# Patient Record
Sex: MALE | Race: WHITE
[De-identification: names, ages, dates, MRNs, and addresses within clinical notes are randomized per-mention and may not be internally consistent; named-entity substitution may affect disease eponyms.]

---

## 2018-04-07 ENCOUNTER — HOSPITAL ENCOUNTER (EMERGENCY)
Age: 58
LOS: 1 days | Discharge: HOME | End: 2018-04-08

## 2018-04-07 ENCOUNTER — HOSPITAL ENCOUNTER (EMERGENCY)
Dept: HOSPITAL 91 - E/R | Age: 58
LOS: 1 days | Discharge: HOME | End: 2018-04-08
Payer: COMMERCIAL

## 2018-04-07 DIAGNOSIS — R41.82: Primary | ICD-10-CM

## 2018-04-07 DIAGNOSIS — F12.129: ICD-10-CM

## 2018-04-07 PROCEDURE — 81001 URINALYSIS AUTO W/SCOPE: CPT

## 2018-04-07 PROCEDURE — 80053 COMPREHEN METABOLIC PANEL: CPT

## 2018-04-07 PROCEDURE — 99284 EMERGENCY DEPT VISIT MOD MDM: CPT

## 2018-04-07 PROCEDURE — 85025 COMPLETE CBC W/AUTO DIFF WBC: CPT

## 2018-04-07 PROCEDURE — 80306 DRUG TEST PRSMV INSTRMNT: CPT

## 2018-04-07 PROCEDURE — 80307 DRUG TEST PRSMV CHEM ANLYZR: CPT

## 2018-04-07 PROCEDURE — 96372 THER/PROPH/DIAG INJ SC/IM: CPT

## 2018-04-07 PROCEDURE — 36415 COLL VENOUS BLD VENIPUNCTURE: CPT

## 2018-04-07 RX ADMIN — HALOPERIDOL LACTATE 1 MG: 5 INJECTION, SOLUTION INTRAMUSCULAR at 23:22

## 2018-04-07 RX ADMIN — LORAZEPAM 1 MG: 2 INJECTION, SOLUTION INTRAMUSCULAR; INTRAVENOUS at 23:22

## 2018-04-08 LAB
ACETAMINOPHEN: < 10 UG/ML (ref 10–30)
ADD MAN DIFF?: NO
ADD UMIC: YES
ALANINE AMINOTRANSFERASE: 21 IU/L (ref 13–69)
ALBUMIN/GLOBULIN RATIO: 1.45
ALBUMIN: 4.8 G/DL (ref 3.3–4.9)
ALKALINE PHOSPHATASE: 88 IU/L (ref 42–121)
ANION GAP: 19 (ref 8–16)
ASPARTATE AMINO TRANSFERASE: 23 IU/L (ref 15–46)
BASOPHIL #: 0.1 10^3/UL (ref 0–0.1)
BASOPHILS %: 0.8 % (ref 0–2)
BILIRUBIN,DIRECT: 0 MG/DL (ref 0–0.2)
BILIRUBIN,TOTAL: 0.3 MG/DL (ref 0.2–1.3)
BLOOD UREA NITROGEN: 15 MG/DL (ref 7–20)
CALCIUM: 10.3 MG/DL (ref 8.4–10.2)
CARBON DIOXIDE: 25 MMOL/L (ref 21–31)
CHLORIDE: 107 MMOL/L (ref 97–110)
CREATININE: 0.56 MG/DL (ref 0.61–1.24)
EOSINOPHILS #: 0.1 10^3/UL (ref 0–0.5)
EOSINOPHILS %: 1.1 % (ref 0–7)
ETHANOL: < 10 MG/DL
GLOBULIN: 3.3 G/DL (ref 1.3–3.2)
GLUCOSE: 115 MG/DL (ref 70–220)
HEMATOCRIT: 45.5 % (ref 42–52)
HEMOGLOBIN: 16 G/DL (ref 14–18)
LYMPHOCYTES #: 1.3 10^3/UL (ref 0.8–2.9)
LYMPHOCYTES %: 13.5 % (ref 15–51)
MEAN CORPUSCULAR HEMOGLOBIN: 30.9 PG (ref 29–33)
MEAN CORPUSCULAR HGB CONC: 35.2 G/DL (ref 32–37)
MEAN CORPUSCULAR VOLUME: 87.8 FL (ref 82–101)
MEAN PLATELET VOLUME: 10.2 FL (ref 7.4–10.4)
MONOCYTE #: 0.8 10^3/UL (ref 0.3–0.9)
MONOCYTES %: 8.4 % (ref 0–11)
NEUTROPHIL #: 7.1 10^3/UL (ref 1.6–7.5)
NEUTROPHILS %: 75.8 % (ref 39–77)
NUCLEATED RED BLOOD CELLS #: 0 10^3/UL (ref 0–0)
NUCLEATED RED BLOOD CELLS%: 0 /100WBC (ref 0–0)
PLATELET COUNT: 253 10^3/UL (ref 140–415)
POTASSIUM: 4 MMOL/L (ref 3.5–5.1)
RED BLOOD COUNT: 5.18 10^6/UL (ref 4.7–6.1)
RED CELL DISTRIBUTION WIDTH: 12.2 % (ref 11.5–14.5)
SALICYLATE: < 1 MG/DL (ref 5–30)
SODIUM: 147 MMOL/L (ref 135–144)
TOTAL PROTEIN: 8.1 G/DL (ref 6.1–8.1)
UR ASCORBIC ACID: NEGATIVE MG/DL
UR BILIRUBIN (DIP): NEGATIVE MG/DL
UR BLOOD (DIP): NEGATIVE MG/DL
UR CLARITY: CLEAR
UR COLOR: YELLOW
UR GLUCOSE (DIP): NEGATIVE MG/DL
UR KETONES (DIP): (no result) MG/DL
UR LEUKOCYTE ESTERASE (DIP): NEGATIVE LEU/UL
UR MUCUS: (no result) /HPF
UR NITRITE (DIP): NEGATIVE MG/DL
UR PH (DIP): 6 (ref 5–9)
UR RBC: 2 /HPF (ref 0–5)
UR SPECIFIC GRAVITY (DIP): 1.03 (ref 1–1.03)
UR TOTAL PROTEIN (DIP): (no result) MG/DL
UR UROBILINOGEN (DIP): NEGATIVE MG/DL
UR WBC: 2 /HPF (ref 0–5)
WHITE BLOOD COUNT: 9.3 10^3/UL (ref 4.8–10.8)

## 2018-04-08 RX ADMIN — OLANZAPINE 1 MG: 10 INJECTION, POWDER, LYOPHILIZED, FOR SOLUTION INTRAMUSCULAR at 01:08

## 2018-04-08 RX ADMIN — DIPHENHYDRAMINE HYDROCHLORIDE 1 MG: 50 INJECTION, SOLUTION INTRAMUSCULAR; INTRAVENOUS at 00:42

## 2018-04-08 RX ADMIN — HYDROCODONE BITARTRATE AND ACETAMINOPHEN 1 TAB: 5; 325 TABLET ORAL at 08:58

## 2019-07-09 ENCOUNTER — HOSPITAL ENCOUNTER (EMERGENCY)
Dept: HOSPITAL 91 - E/R | Age: 59
Discharge: HOME | End: 2019-07-09
Payer: COMMERCIAL

## 2019-07-09 ENCOUNTER — HOSPITAL ENCOUNTER (EMERGENCY)
Dept: HOSPITAL 10 - E/R | Age: 59
Discharge: HOME | End: 2019-07-09
Payer: COMMERCIAL

## 2019-07-09 VITALS — HEART RATE: 94 BPM | DIASTOLIC BLOOD PRESSURE: 83 MMHG | RESPIRATION RATE: 18 BRPM | SYSTOLIC BLOOD PRESSURE: 119 MMHG

## 2019-07-09 VITALS — BODY MASS INDEX: 28.13 KG/M2 | WEIGHT: 143.3 LBS | HEIGHT: 60 IN

## 2019-07-09 DIAGNOSIS — T40.601A: Primary | ICD-10-CM

## 2019-07-09 DIAGNOSIS — R00.0: ICD-10-CM

## 2019-07-09 PROCEDURE — 99284 EMERGENCY DEPT VISIT MOD MDM: CPT

## 2019-07-09 RX ADMIN — THIAMINE HYDROCHLORIDE 1 MLS/HR: 100 INJECTION, SOLUTION INTRAMUSCULAR; INTRAVENOUS at 14:43

## 2019-07-09 NOTE — ERD
ER Documentation


Chief Complaint


Chief Complaint





overdose on subaxone and given narcan 2mg given and awake on arrival





HPI


This is a 58-year-old male with a past medical history of chronic back pain 


currently on Suboxone who is presenting with a concern of a possible accidental 


overdose.  The patient was found this morning unresponsive with pinpoint pupils.


 An ambulance was called.  On the scene, paramedics gave the patient 2 mg of 


Narcan intravenously, and the patient woke up.  Currently he feels very cold and


nauseated.  He is tremulous and is requesting a blanket.  He is currently alert 


and oriented.  The patient does report taking his Suboxone frequently for his 


chronic pain, but he does not believe that he took more than prescribed.  He is 


not suicidal or homicidal.  He does not want to hurt himself.





The patient denies fever or chills.  The patient has had no headache or vision 


changes.  The patient does not endorse neck or back pain. The patient denies 


lightheadedness or dizziness.  The patient has had no chest pain or trouble 


breathing.  The patient denies vomiting. The patient denies abdominal pain. The 


patient denies changes to bowel movements or urination.  The patient has had no 


focal deficits.  The patient has had no weakness or numbness or tingling to the 


face or extremities.





ROS


All systems reviewed and are negative except as per history of present illness.





Medications


Home Meds


Reported Medications


Varenicline Tartrate (Chantix) 1 Mg Tablet, 1 MG PO BID, TAB


   7/9/19


Buprenorphine Hcl-Naloxone Hcl (Suboxone SL) 8-2 Mg Film, 1 FILM SL BID, FILM


   7/9/19


Amitriptyline Hcl* (Amitriptyline Hcl*) 50 Mg Tablet, 50 MG PO QHS, #30 TAB


   7/9/19


Cyclobenzaprine Hcl* (Cyclobenzaprine Hcl*) 10 Mg Tablet, 10 MG PO DAILY, #60 


TAB


   7/9/19





Allergies


Allergies:  


Coded Allergies:  


     No Known Allergy (Unverified , 7/9/19)





PMhx/Soc


Hx Miscellaneous Medical Probl:  Yes (chronic back pain)





Physical Exam


Vitals





Vital Signs


  Date      Temp  Pulse  Resp  B/P (MAP)   Pulse Ox  O2          O2 Flow    FiO2


Time                                                 Delivery    Rate


    7/9/19  97.8    135    18      108/71        98  Room Air


     14:15                           (83)


    7/9/19  97.8    140    18      108/71        98


     14:04                           (83)





Physical Exam


Const:   No apparent distress, well-developed, well-nourished


Head:   Normocephalic, Atraumatic 


Eyes:   Normal Conjunctiva.  Extraocular movements intact. Pupils equal, round 


and reactive to light


ENT:   Normal External Ears, Nose and Mouth.


Neck:   Full range of motion. No meningismus.


Resp:   Clear to auscultation bilaterally, No wheezes, rales or rhonchi


Cardio:   Regular rhythm.  Tachycardia.  No murmurs, rubs or gallops


Abd:   Soft, non tender, non distended. Normal bowel sounds


Skin:   No petechiae or rashes


Back:   No midline tenderness. No CVA tenderness


Ext:   No cyanosis, or edema


Neur:   Awake and alert, oriented 4.  Cranial nerves intact.  No facial droop. 


Normal strength, sensation and coordination.


Psych:   Anxious


Results 24 hrs





Current Medications


 Medications
   Dose
          Sig/Gricelda
       Start Time
   Status  Last


 (Trade)       Ordered        Route
 PRN     Stop Time              Admin
Dose


                              Reason                                Admin


 Sodium         1,000 ml @ 
   Q1H ONCE
      7/9/19        DC            7/9/19


Chloride       1,000 mls/hr   IV
            14:30
 7/9/19                14:43



                                             15:29








Procedures/MDM


MDM





The patient symptoms are consistent with an accidental overdose.  The patient 


was given Narcan in the field and woke up immediately.  The patient is currently


exhibiting symptoms of opiate withdrawal.  He was observed for 2 hours and 


remained alert and oriented.  The patient's lungs remained clear.  I do not 


suspect pulmonary edema related to Narcan use.  I do not feel the patient 


requires any further work-up at this time.





I suspect that the patient's tachycardia is related to his current opiate 


withdrawal.  The patient does not have any chest pain or trouble breathing.  He 


does not have symptoms concerning for a cardiopulmonary process at this time.





TREATMENT/DISPOSITION





The patient was treated with IV fluids in the emergency department.





DISCHARGE





Upon reevaluation of the patient, symptoms have improved. No emergent diagnoses 


were identified. At this time, I feel that the patient stable for discharge.  


The patient was instructed to follow-up with a primary care physician in 1-3 


days.  The patient will be given strict precautions with which to return to the 


emergency department.





Prescriptions: Narcan








Disclaimer: Inadvertent spelling and grammatical errors are likely due to 


EHR/dictation software use and do not reflect on the overall quality of patient 


care. Note that the electronic time recorded on this note does not necessarily 


reflect the actual time of the patient encounter.





Departure


Diagnosis:  


   Primary Impression:  


   Accidental overdose


   Encounter type:  initial encounter  Qualified Codes:  T50.901A - Poisoning by


   unspecified drugs, medicaments and biological substances, accidental 


   (unintentional), initial encounter


   Additional Impressions:  


   Opiate overdose


   Encounter type:  initial encounter  Injury intent:  accidental or 


   unintentional  Qualified Codes:  T40.601A - Poisoning by unspecified 


   narcotics, accidental (unintentional), initial encounter


   Sinus tachycardia


Condition:  Stable


Patient Instructions:  Overdose, Accidental (Adult), Overdose, Opiate





Additional Instructions:  


Thank you for for coming to [facility] for your care today. 


Please ask your nurse or provider if you have questions about your care today 


and do not leave until all your questions have been answered.  Please use any 


medications given as directed and follow-up with your doctor (or the doctor you 


were referred to) in the next 1-3 days. If you do not have a primary care doctor


you may follow up at the [facility] or [facility] (listed below). You


may also use motrin and tylenol as needed for fever and/or pain unless 


instructed otherwise by your provider or nurse. Indications for more urgent 


follow-up have been discussed, but you may return to the Emergency Department at


ANY time for any worrisome or worsening symptoms.





If you have abdominal pain, please know that no test or exam you received is 


perfect and you should follow up within 8 hours for continued pain.





If you had any imaging studies today, such as an X-Ray or CT Scan, these studies


will be reviewed later by a radiologist. You will be called if there are 


important findings that were not identified today, so make sure the contact 


information you provided at registration is correct.





If you received any narcotic pain control medicine today, such as Vicodin, 


Morphine or Dilaudid, your coordination and judgment may be affected for a 


number of hours. Please do not drive or operate heavy machinery, and you may 


want someone to assist you at home. If you were given a prescription for 


narcotic medication, be aware that it is very addictive- use sparingly and only 


if necessary.





PLEASE SEEK FURTHER EVALUATION AND MANAGEMENT AT YOUR DOCTORS OFFICE WITHIN THE 


NEXT 1-3 DAYS. IT IS YOUR RESPONSIBILITY TO MAKE AN APPOINTMENT FOR FOLOW-UP 


CARE.





IF YOU HAVE A PRIMARY DOCTOR, PLEASE CALL THEIR OFFICE TO SCHEDULE AN 


APPOINTMENT FOR FOLLOW UP.





IF YOU DO NOT HAVE A PRIMARY DOCTOR YOU CAN CALL OUR PHYSICIAN REFERRAL HOTLINE 


AT [phone] 





IF YOU CAN NOT AFFORD TO SEE A PHYSICIAN YOU CAN CHOSE FROM THE FOLLOWING 


[facility] CLINICS:





[facility] [phone] [address] [facility] [phone] [address] [facility]. [facility] [phone] [address] [facility] [phone] [address] [phone] [address] [facility]. [facility]. [phone] [address] PRASHANTH WEATHERS MD               Jul 9, 2019 14:41